# Patient Record
Sex: MALE | Race: WHITE | Employment: FULL TIME | URBAN - METROPOLITAN AREA
[De-identification: names, ages, dates, MRNs, and addresses within clinical notes are randomized per-mention and may not be internally consistent; named-entity substitution may affect disease eponyms.]

---

## 2017-01-18 ENCOUNTER — OFFICE VISIT (OUTPATIENT)
Dept: SURGERY | Age: 52
End: 2017-01-18

## 2017-01-18 VITALS
HEIGHT: 70 IN | DIASTOLIC BLOOD PRESSURE: 65 MMHG | SYSTOLIC BLOOD PRESSURE: 118 MMHG | OXYGEN SATURATION: 100 % | WEIGHT: 212 LBS | BODY MASS INDEX: 30.35 KG/M2 | HEART RATE: 54 BPM

## 2017-01-18 DIAGNOSIS — K90.9 INTESTINAL MALABSORPTION, UNSPECIFIED TYPE: Primary | ICD-10-CM

## 2017-01-18 DIAGNOSIS — Z98.84 STATUS POST GASTRIC BYPASS FOR OBESITY: ICD-10-CM

## 2017-01-18 RX ORDER — BISMUTH SUBSALICYLATE 262 MG
1 TABLET,CHEWABLE ORAL DAILY
COMMUNITY

## 2017-01-18 NOTE — MR AVS SNAPSHOT
Visit Information Date & Time Provider Department Dept. Phone Encounter #  
 1/18/2017 11:30 AM Anita Walker [facility] [phone] Your Appointments 7/19/2017 11:30 AM  
Office Visit with MD Emily Walker [facility]) Appt Note: f/u  
 [address] Jose Daniel [address] [place] [address] [address]  
  
   
 [address] [place] [address] Upcoming Health Maintenance Date Due DTaP/Tdap/Td series (1 - Tdap) 3/3/1988 INFLUENZA AGE 9 TO ADULT 8/1/2016 Allergies as of 1/18/2017  Review Complete On: 1/18/2017 By: Cinthia Ferrara No Known Allergies Current Immunizations  Never Reviewed No immunizations on file. Not reviewed this visit You Were Diagnosed With   
  
 Codes Comments Intestinal malabsorption, unspecified type    -  Primary ICD-10-CM: K90.9 ICD-9-CM: 579.9 Status post gastric bypass for obesity     ICD-10-CM: Z98.84 ICD-9-CM: V45.86 Vitals BP Pulse Height(growth percentile) Weight(growth percentile) SpO2 BMI  
 118/65 (BP 1 Location: Left arm, BP Patient Position: Sitting) (!) 54 5' 10\" (1.778 m) 212 lb (96.2 kg) 100% 30.42 kg/m2 Smoking Status Never Smoker BMI and BSA Data Body Mass Index Body Surface Area  
 30.42 kg/m 2 2.18 m 2 Preferred Pharmacy Pharmacy Name Phone [id number] [address] [place] [address] [address] [place] [address], [address] [place] [address] [phone] Your Updated Medication List  
  
   
This list is accurate as of: 1/18/17 12:05 PM.  Always use your most recent med list.  
  
  
  
  
 multivitamin tablet Commonly known as:  ONE A DAY Take 1 Tab by mouth daily. multivitamin with iron chewable tablet Commonly known as:  Jeanne Caroli Take 1 Tab by mouth daily. Indications: bariatric advantage PROBIOTIC PO Take  by mouth. ursodiol 500 mg tablet Commonly known as:  SERGIO FORTE Take 1 Tab by mouth daily. Take with food. Indications: CHOLELITHIASIS PREVENTION  
  
 VITAMIN B-12 1,000 mcg sublingual tablet Generic drug:  cyanocobalamin Take 1,000 mcg by mouth daily. Patient Instructions Body Mass Index: Care Instructions Your Care Instructions Body mass index (BMI) can help you see if your weight is raising your risk for health problems. It uses a formula to compare how much you weigh with how tall you are. A BMI between 18.5 and 24.9 is considered healthy. A BMI between 25 and 29.9 is considered overweight. A BMI of 30 or higher is considered obese. If your BMI is in the normal range, it means that you have a lower risk for weight-related health problems. If your BMI is in the overweight or obese range, you may be at increased risk for weight-related health problems, such as high blood pressure, heart disease, stroke, arthritis or joint pain, and diabetes. BMI is just one measure of your risk for weight-related health problems. You may be at higher risk for health problems if you are not active, you eat an unhealthy diet, or you drink too much alcohol or use tobacco products. Follow-up care is a key part of your treatment and safety. Be sure to make and go to all appointments, and call your doctor if you are having problems. It's also a good idea to know your test results and keep a list of the medicines you take. How can you care for yourself at home? · Practice healthy eating habits. This includes eating plenty of fruits, vegetables, whole grains, lean protein, and low-fat dairy. · Get at least 30 minutes of exercise 5 days a week or more. Brisk walking is a good choice. You also may want to do other activities, such as running, swimming, cycling, or playing tennis or team sports. · Do not smoke. Smoking can increase your risk for health problems.  If you need help quitting, talk to your doctor about stop-smoking programs and medicines. These can increase your chances of quitting for good. · Limit alcohol to 2 drinks a day for men and 1 drink a day for women. Too much alcohol can cause health problems. If you have a BMI higher than 25 · Your doctor may do other tests to check your risk for weight-related health problems. This may include measuring the distance around your waist. A waist measurement of more than 40 inches in men or 35 inches in women can increase the risk of weight-related health problems. · Talk with your doctor about steps you can take to stay healthy or improve your health. You may need to make lifestyle changes to lose weight and stay healthy, such as changing your diet and getting regular exercise. Where can you learn more? Go to http://dalila-eliel.info/. Enter S176 in the search box to learn more about \"Body Mass Index: Care Instructions. \" Current as of: February 16, 2016 Content Version: 11.1 © 0864-3763 [employer]. Care instructions adapted under license by [employer] (which disclaims liability or warranty for this information). If you have questions about a medical condition or this instruction, always ask your healthcare professional. [address] any warranty or liability for your use of this information. Introducing [facility]! Dae Doe introduces [employer] patient portal. Now you can access parts of your medical record, email your doctor's office, and request medication refills online. 1. In your internet browser, go to [email or web address]. [email or web address] 2. Click on the First Time User? Click Here link in the Sign In box. You will see the New Member Sign Up page. 3. Enter your [employer] Access Code exactly as it appears below. You will not need to use this code after youve completed the sign-up process.  If you do not sign up before the expiration date, you must request a new code. · kingsky Access Code: [email or web address] Expires: 2/16/2017 12:20 PM 
 
4. Enter the last four digits of your Social Security Number (xxxx) and Date of Birth (mm/dd/yyyy) as indicated and click Submit. You will be taken to the next sign-up page. 5. Create a kingsky ID. This will be your kingsky login ID and cannot be changed, so think of one that is secure and easy to remember. 6. Create a kingsky password. You can change your password at any time. 7. Enter your Password Reset Question and Answer. This can be used at a later time if you forget your password. 8. Enter your e-mail address. You will receive e-mail notification when new information is available in [address] 9. Click Sign Up. You can now view and download portions of your medical record. 10. Click the Download Summary menu link to download a portable copy of your medical information. If you have questions, please visit the Frequently Asked Questions section of the kingsky website. Remember, kingsky is NOT to be used for urgent needs. For medical emergencies, dial 911. Now available from your iPhone and Android! Please provide this summary of care documentation to your next provider. Your primary care clinician is listed as Mohamud Hunter. If you have any questions after today's visit, please call [phone].

## 2017-01-18 NOTE — PROGRESS NOTES
6.5 months follow-up    Subjective:     Santos Cardenas  is a 54 y.o. male who presents for follow-up about 6.5 months following laparoscopic gastric bypass surgery. He   has lost a total of 111 pounds since surgery. Body mass index is 30.42 kg/(m^2). . EBWL is (64%). The patient presents today to   assess their progress toward their goal of weight loss and to address any issues that may be present. Today the patient and I have   reviewed their diet and how appropriate their food choices are. The following issues have been identified - none from a surgical   standpoint. He lost 80 lbs prior to surgery making his total weight loss 191 lbs. Pain assessment - 0/10  . Surgery related complication: NA       He reports no real issues and denies vomiting, abdominal pain, diarrhea and difficulty breathing. The patient's exercise level: very active. Changes in his medical history and medications have been reviewed.     Patient Active Problem List   Diagnosis Code    Morbid obesity with BMI of 50.0-59.9, adult ([facility]) E66.01, Z68.43    Hypertension I10    Anxiety F41.9    Snoring R06.83    GERD (gastroesophageal reflux disease) K21.9    Arthritic-like pain M25.50    Morbid obesity with BMI of 45.0-49.9, adult ([facility]) E66.01, Z68.42    S/P bariatric surgery Z98.84    Intestinal malabsorption K90.9    Status post gastric bypass for obesity Z98.84     Past Medical History   Diagnosis Date    Anxiety     Arthritic-like pain     GERD (gastroesophageal reflux disease)      pt states resolved    Hypertension      pt states resolved    Intestinal malabsorption     Morbid obesity with BMI of 50.0-59.9, adult ([facility])     Snoring      no official diagnosis of sleep apnea    Status post gastric bypass for obesity 06/2016     teresa     Past Surgical History   Procedure Laterality Date    Pr lap gastric bypass/candie-en-y  06/2016     teresa     Current Outpatient Prescriptions   Medication Sig Dispense Refill    multivitamin (ONE A DAY) tablet Take 1 Tab by mouth daily.  LACTOBACILLUS ACIDOPHILUS (PROBIOTIC PO) Take  by mouth.  cyanocobalamin (VITAMIN B-12) 1,000 mcg sublingual tablet Take 1,000 mcg by mouth daily.  ursodiol (SERGIO FORTE) 500 mg tablet Take 1 Tab by mouth daily. Take with food. Indications: CHOLELITHIASIS PREVENTION 30 Tab 4    multivitamin with iron (FLINTSTONES) chewable tablet Take 1 Tab by mouth daily. Indications: bariatric advantage           Review of Symptoms:     General - No history or complaints of unexpected fever or chills  Head/Neck - No history or complaints of headache or dizziness  Cardiac - No history or complaints of chest pain, palpitations, or shortness of breath  Pulmonary - No history or complaints of shortness of breath or productive cough  Gastrointestinal - as noted above  Genitourinary - No history or complaints of hematuria/dysuria or renal lithiasis  Musculoskeletal - No history or complaints of joint  muscular weakness  Hematologic - No history of any bleeding episodes  Neurologic - No history or complaints of  migraine headaches or neurologic symptoms    Objective:     Visit Vitals    /65 (BP 1 Location: Left arm, BP Patient Position: Sitting)    Pulse (!) 54    Ht 5' 10\" (1.778 m)    Wt 96.2 kg (212 lb)    SpO2 100%    BMI 30.42 kg/m2        Physical Exam:    General:  alert, cooperative, no distress, appears stated age   Lungs:   clear to auscultation bilaterally   Heart:  Regular rate and rhythm   Abdomen:   abdomen is soft without significant tenderness, masses, organomegaly or guarding;     Incisions: healing well, no significant drainage         Labs:     Recent Results (from the past 2016 hour(s))   VITAMIN B12 & FOLATE    Collection Time: 11/18/16 12:33 PM   Result Value Ref Range    Vitamin B12 >2000 (H) 211 - 911 pg/mL    Folate >20.0 (H) 3.10 - 17.50 ng/mL   CBC WITH AUTOMATED DIFF    Collection Time: 11/18/16 12:33 PM   Result Value Ref Range    WBC 7.4 4.6 - 13.2 K/uL    RBC 5.15 4.70 - 5.50 M/uL    HGB 15.2 13.0 - 16.0 g/dL    HCT 43.9 36.0 - 48.0 %    MCV 85.2 74.0 - 97.0 FL    MCH 29.5 24.0 - 34.0 PG    MCHC 34.6 31.0 - 37.0 g/dL    RDW 14.2 11.6 - 14.5 %    PLATELET 196 622 - 579 K/uL    MPV 11.4 9.2 - 11.8 FL    NEUTROPHILS 66 40 - 73 %    LYMPHOCYTES 27 21 - 52 %    MONOCYTES 6 3 - 10 %    EOSINOPHILS 1 0 - 5 %    BASOPHILS 0 0 - 2 %    ABS. NEUTROPHILS 4.9 1.8 - 8.0 K/UL    ABS. LYMPHOCYTES 2.0 0.9 - 3.6 K/UL    ABS. MONOCYTES 0.4 0.05 - 1.2 K/UL    ABS. EOSINOPHILS 0.1 0.0 - 0.4 K/UL    ABS. BASOPHILS 0.0 0.0 - 0.06 K/UL    DF AUTOMATED     FERRITIN    Collection Time: 11/18/16 12:33 PM   Result Value Ref Range    Ferritin 511 (H) 8 - 388 NG/ML   IRON    Collection Time: 11/18/16 12:33 PM   Result Value Ref Range    Iron 72 50 - 668 ug/dL   METABOLIC PANEL, COMPREHENSIVE    Collection Time: 11/18/16 12:33 PM   Result Value Ref Range    Sodium 145 136 - 145 mmol/L    Potassium 4.4 3.5 - 5.5 mmol/L    Chloride 106 100 - 108 mmol/L    CO2 31 21 - 32 mmol/L    Anion gap 8 3.0 - 18 mmol/L    Glucose 79 74 - 99 mg/dL    BUN 15 7.0 - 18 MG/DL    Creatinine 0.72 0.6 - 1.3 MG/DL    BUN/Creatinine ratio 21 (H) 12 - 20      GFR est AA >60 >60 ml/min/1.73m2    GFR est non-AA >60 >60 ml/min/1.73m2    Calcium 9.5 8.5 - 10.1 MG/DL    Bilirubin, total 0.9 0.2 - 1.0 MG/DL    ALT 16 16 - 61 U/L    AST 11 (L) 15 - 37 U/L    Alk. phosphatase 75 45 - 117 U/L    Protein, total 7.2 6.4 - 8.2 g/dL    Albumin 4.2 3.4 - 5.0 g/dL    Globulin 3.0 2.0 - 4.0 g/dL    A-G Ratio 1.4 0.8 - 1.7     VITAMIN B1, WHOLE BLOOD    Collection Time: 11/18/16 12:33 PM   Result Value Ref Range    Vitamin B1 232.1 (H) 66.5 - 200.0 nmol/L   VITAMIN D, 25 HYDROXY    Collection Time: 11/18/16 12:34 PM   Result Value Ref Range    Vitamin D 25-Hydroxy 37.5 30 - 100 ng/mL       Assessment:     1. History of Morbid obesity, status post  laparoscopic gastric bypass surgery.  Doing well, no concerns. He has stopped his MVI with iron   given his ferritin of 511. He desires another 22 lbs to reach his goal of 190 lbs. He will add wt resistance to his plan as he only does cardio   at this point. A recent PCP visit was unremarkable. Plan:     1. Remember to measure portions, continue low carbohydrate diet  2. Lab reviewed with patient and appropriate changes were made to vitamin regimen. 3. Remember vitamin supplements - The importance of such was discussed regarding the malabsorptive issues that the surgery creates  4. Exercise regimen appears adequate. 5. Attend support group  6. Follow-up in 6 month(s). 7. Total time spent with the patient 30 minutes.   8. The patient understands the plan of action

## 2017-01-18 NOTE — PATIENT INSTRUCTIONS
Body Mass Index: Care Instructions  Your Care Instructions    Body mass index (BMI) can help you see if your weight is raising your risk for health problems. It uses a formula to compare how much you weigh with how tall you are. A BMI between 18.5 and 24.9 is considered healthy. A BMI between 25 and 29.9 is considered overweight. A BMI of 30 or higher is considered obese. If your BMI is in the normal range, it means that you have a lower risk for weight-related health problems. If your BMI is in the overweight or obese range, you may be at increased risk for weight-related health problems, such as high blood pressure, heart disease, stroke, arthritis or joint pain, and diabetes. BMI is just one measure of your risk for weight-related health problems. You may be at higher risk for health problems if you are not active, you eat an unhealthy diet, or you drink too much alcohol or use tobacco products. Follow-up care is a key part of your treatment and safety. Be sure to make and go to all appointments, and call your doctor if you are having problems. It's also a good idea to know your test results and keep a list of the medicines you take. How can you care for yourself at home? · Practice healthy eating habits. This includes eating plenty of fruits, vegetables, whole grains, lean protein, and low-fat dairy. · Get at least 30 minutes of exercise 5 days a week or more. Brisk walking is a good choice. You also may want to do other activities, such as running, swimming, cycling, or playing tennis or team sports. · Do not smoke. Smoking can increase your risk for health problems. If you need help quitting, talk to your doctor about stop-smoking programs and medicines. These can increase your chances of quitting for good. · Limit alcohol to 2 drinks a day for men and 1 drink a day for women. Too much alcohol can cause health problems.   If you have a BMI higher than 25  · Your doctor may do other tests to check your risk for weight-related health problems. This may include measuring the distance around your waist. A waist measurement of more than 40 inches in men or 35 inches in women can increase the risk of weight-related health problems. · Talk with your doctor about steps you can take to stay healthy or improve your health. You may need to make lifestyle changes to lose weight and stay healthy, such as changing your diet and getting regular exercise. Where can you learn more? Go to http://dalila-eliel.info/. Enter S176 in the search box to learn more about \"Body Mass Index: Care Instructions. \"  Current as of: February 16, 2016  Content Version: 11.1  © 2095-7110 [employer]. Care instructions adapted under license by [employer] (which disclaims liability or warranty for this information). If you have questions about a medical condition or this instruction, always ask your healthcare professional. [place] [address] any warranty or liability for your use of this information. Patient Instructions      1. Continue to monitor carbohydrate and protein intake- remember to keep your           total  carbohydrates to 50 grams or less per day for best results. 2. Remember hydration goals - usually 48 to 64 ounces of liquids per day  3. Continue to work towards exercise goals - minimum 3 days per week of 45          minutes to  1 hour at a time. 4. Remember to take vitamins as directed        Supplement Resource Guide    Importance of Protein:   Maintains lean body mass, produces antibodies to fight off infections, heals wounds, minimizes hair loss, helps to give you energy, helps with satiety, and keeping you full between meals.     Importance of Calcium:  Needed for healthy bones and teeth, normal blood clotting, and nervous system functioning, higher risk of osteoporosis and bone disease with non-compliance. Importance of Multivitamins: Many functions. Supply you with extra nutrients that you may be missing from food. May lead to iron deficiency anemia, weakness, fatigue, and many other symptoms with non-compliance. Importance of B Vitamins:  Important for red blood cell formation, metabolism, energy, and helps to maintain a healthy nervous system. Protein Supplement  Find one you like now. Use immediately after surgery. Look for:  35-50g protein each day from your protein supplement once you reach the progression diet. 0-3 g fat per serving  0-3 g sugar per serving    Protein drinks should be split in separate dosages. Recommend: Lifelong  1 year + Calcium Supplement:     Start taking within a month after surgery. Look for: Calcium Citrate Plus D (1500 mg per day)  Recommend: Citracal     .          Avoid chocolate chewable calcium. Can use chewable bariatric or [employer] brand or similar chewable. The body cannot absorb more than 500-600 mg @ a time. Take for Life Multi-vitamin Supplement:      1st Month After Surgery: Any complete chewable, such as: [place]s Complete chewables. Avoid [place] sours or gummies. They lack iron and other important nutrients and also have added sugar. Continue with chewable vitamin or change to adult complete multivitamin one month after surgery. Menstruating women can take a prenatal vitamin. Make sure has at least 18 mg iron and 477-635 mcg folic acid):    Vitamin B12, B Complex Vitamin, and Biotin  Start taking within a month after surgery. Vitamin B12:  1000 mcg of Vitamin B12 three times weekly    Must take sublingually (meaning you take it under your tongue) or in a liquid drop form for easy absorption. B Complex Vitamin: Take a pill or liquid drop form once daily. Biotin: This vitamin can help prevent hair loss.     Recommend 5mg   (5000 mcg) a day  Biotin is Optional

## 2017-07-19 ENCOUNTER — OFFICE VISIT (OUTPATIENT)
Dept: SURGERY | Age: 52
End: 2017-07-19

## 2017-07-19 ENCOUNTER — HOSPITAL ENCOUNTER (OUTPATIENT)
Dept: LAB | Age: 52
Discharge: HOME | End: 2017-07-19
Payer: COMMERCIAL

## 2017-07-19 VITALS
HEIGHT: 70 IN | OXYGEN SATURATION: 100 % | HEART RATE: 50 BPM | SYSTOLIC BLOOD PRESSURE: 106 MMHG | DIASTOLIC BLOOD PRESSURE: 61 MMHG | WEIGHT: 176 LBS | BODY MASS INDEX: 25.2 KG/M2

## 2017-07-19 DIAGNOSIS — Z98.84 S/P BARIATRIC SURGERY: ICD-10-CM

## 2017-07-19 DIAGNOSIS — K90.9 INTESTINAL MALABSORPTION, UNSPECIFIED TYPE: ICD-10-CM

## 2017-07-19 DIAGNOSIS — K90.9 INTESTINAL MALABSORPTION, UNSPECIFIED TYPE: Primary | ICD-10-CM

## 2017-07-19 LAB
25(OH)D3 SERPL-MCNC: 34.7 NG/ML (ref 30–100)
ALBUMIN SERPL BCP-MCNC: 4.4 G/DL (ref 3.4–5)
ALBUMIN/GLOB SERPL: 1.6 {RATIO} (ref 0.8–1.7)
ALP SERPL-CCNC: 58 U/L (ref 45–117)
ALT SERPL-CCNC: 114 U/L (ref 16–61)
ANION GAP BLD CALC-SCNC: 11 MMOL/L (ref 3–18)
AST SERPL W P-5'-P-CCNC: 36 U/L (ref 15–37)
BASOPHILS # BLD AUTO: 0 K/UL (ref 0–0.06)
BASOPHILS # BLD: 1 % (ref 0–2)
BILIRUB SERPL-MCNC: 0.6 MG/DL (ref 0.2–1)
BUN SERPL-MCNC: 26 MG/DL (ref 7–18)
BUN/CREAT SERPL: 39 (ref 12–20)
CALCIUM SERPL-MCNC: 9.2 MG/DL (ref 8.5–10.1)
CHLORIDE SERPL-SCNC: 105 MMOL/L (ref 100–108)
CO2 SERPL-SCNC: 28 MMOL/L (ref 21–32)
CREAT SERPL-MCNC: 0.66 MG/DL (ref 0.6–1.3)
DIFFERENTIAL METHOD BLD: ABNORMAL
EOSINOPHIL # BLD: 0.1 K/UL (ref 0–0.4)
EOSINOPHIL NFR BLD: 1 % (ref 0–5)
ERYTHROCYTE [DISTWIDTH] IN BLOOD BY AUTOMATED COUNT: 13.6 % (ref 11.6–14.5)
FERRITIN SERPL-MCNC: 585 NG/ML (ref 8–388)
FOLATE SERPL-MCNC: >20 NG/ML (ref 3.1–17.5)
GLOBULIN SER CALC-MCNC: 2.8 G/DL (ref 2–4)
GLUCOSE SERPL-MCNC: 85 MG/DL (ref 74–99)
HCT VFR BLD AUTO: 40.4 % (ref 36–48)
HGB BLD-MCNC: 14 G/DL (ref 13–16)
IRON SERPL-MCNC: 95 UG/DL (ref 50–175)
LYMPHOCYTES # BLD AUTO: 39 % (ref 21–52)
LYMPHOCYTES # BLD: 2 K/UL (ref 0.9–3.6)
MCH RBC QN AUTO: 30 PG (ref 24–34)
MCHC RBC AUTO-ENTMCNC: 34.7 G/DL (ref 31–37)
MCV RBC AUTO: 86.7 FL (ref 74–97)
MONOCYTES # BLD: 0.3 K/UL (ref 0.05–1.2)
MONOCYTES NFR BLD AUTO: 6 % (ref 3–10)
NEUTS SEG # BLD: 2.8 K/UL (ref 1.8–8)
NEUTS SEG NFR BLD AUTO: 53 % (ref 40–73)
PLATELET # BLD AUTO: 158 K/UL (ref 135–420)
PMV BLD AUTO: 10.9 FL (ref 9.2–11.8)
POTASSIUM SERPL-SCNC: 4.1 MMOL/L (ref 3.5–5.5)
PROT SERPL-MCNC: 7.2 G/DL (ref 6.4–8.2)
RBC # BLD AUTO: 4.66 M/UL (ref 4.7–5.5)
SODIUM SERPL-SCNC: 144 MMOL/L (ref 136–145)
VIT B12 SERPL-MCNC: 1385 PG/ML (ref 211–911)
WBC # BLD AUTO: 5.2 K/UL (ref 4.6–13.2)

## 2017-07-19 PROCEDURE — 36415 COLL VENOUS BLD VENIPUNCTURE: CPT | Performed by: SPECIALIST

## 2017-07-19 PROCEDURE — 82607 VITAMIN B-12: CPT | Performed by: SPECIALIST

## 2017-07-19 PROCEDURE — 85025 COMPLETE CBC W/AUTO DIFF WBC: CPT | Performed by: SPECIALIST

## 2017-07-19 PROCEDURE — 83540 ASSAY OF IRON: CPT | Performed by: SPECIALIST

## 2017-07-19 PROCEDURE — 82728 ASSAY OF FERRITIN: CPT | Performed by: SPECIALIST

## 2017-07-19 PROCEDURE — 80053 COMPREHEN METABOLIC PANEL: CPT | Performed by: SPECIALIST

## 2017-07-19 PROCEDURE — 84425 ASSAY OF VITAMIN B-1: CPT | Performed by: SPECIALIST

## 2017-07-19 PROCEDURE — 82306 VITAMIN D 25 HYDROXY: CPT | Performed by: SPECIALIST

## 2017-07-19 NOTE — PATIENT INSTRUCTIONS
Body Mass Index: Care Instructions  Your Care Instructions    Body mass index (BMI) can help you see if your weight is raising your risk for health problems. It uses a formula to compare how much you weigh with how tall you are. · A BMI lower than 18.5 is considered underweight. · A BMI between 18.5 and 24.9 is considered healthy. · A BMI between 25 and 29.9 is considered overweight. A BMI of 30 or higher is considered obese. If your BMI is in the normal range, it means that you have a lower risk for weight-related health problems. If your BMI is in the overweight or obese range, you may be at increased risk for weight-related health problems, such as high blood pressure, heart disease, stroke, arthritis or joint pain, and diabetes. If your BMI is in the underweight range, you may be at increased risk for health problems such as fatigue, lower protection (immunity) against illness, muscle loss, bone loss, hair loss, and hormone problems. BMI is just one measure of your risk for weight-related health problems. You may be at higher risk for health problems if you are not active, you eat an unhealthy diet, or you drink too much alcohol or use tobacco products. Follow-up care is a key part of your treatment and safety. Be sure to make and go to all appointments, and call your doctor if you are having problems. It's also a good idea to know your test results and keep a list of the medicines you take. How can you care for yourself at home? · Practice healthy eating habits. This includes eating plenty of fruits, vegetables, whole grains, lean protein, and low-fat dairy. · If your doctor recommends it, get more exercise. Walking is a good choice. Bit by bit, increase the amount you walk every day. Try for at least 30 minutes on most days of the week. · Do not smoke. Smoking can increase your risk for health problems. If you need help quitting, talk to your doctor about stop-smoking programs and medicines. These can increase your chances of quitting for good. · Limit alcohol to 2 drinks a day for men and 1 drink a day for women. Too much alcohol can cause health problems. If you have a BMI higher than 25  · Your doctor may do other tests to check your risk for weight-related health problems. This may include measuring the distance around your waist. A waist measurement of more than 40 inches in men or 35 inches in women can increase the risk of weight-related health problems. · Talk with your doctor about steps you can take to stay healthy or improve your health. You may need to make lifestyle changes to lose weight and stay healthy, such as changing your diet and getting regular exercise. If you have a BMI lower than 18.5  · Your doctor may do other tests to check your risk for health problems. · Talk with your doctor about steps you can take to stay healthy or improve your health. You may need to make lifestyle changes to gain or maintain weight and stay healthy, such as getting more healthy foods in your diet and doing exercises to build muscle. Where can you learn more? Go to http://dalila-eliel.info/. Enter S176 in the search box to learn more about \"Body Mass Index: Care Instructions. \"  Current as of: January 23, 2017  Content Version: 11.3  © 2284-6830 [employer]. Care instructions adapted under license by [employer] (which disclaims liability or warranty for this information). If you have questions about a medical condition or this instruction, always ask your healthcare professional. [place] [address] any warranty or liability for your use of this information. Patient Instructions      1. Continue to monitor carbohydrate and protein intake- remember to keep your           total  carbohydrates to 50 grams or less per day for best results.   2. Remember hydration goals - usually 48 to 64 ounces of liquids per day  3. Continue to work towards exercise goals - minimum 3 days per week of 45          minutes to  1 hour at a time. 4. Remember to take vitamins as directed        Supplement Resource Guide    Importance of Protein:   Maintains lean body mass, produces antibodies to fight off infections, heals wounds, minimizes hair loss, helps to give you energy, helps with satiety, and keeping you full between meals. Importance of Calcium:  Needed for healthy bones and teeth, normal blood clotting, and nervous system functioning, higher risk of osteoporosis and bone disease with non-compliance. Importance of Multivitamins: Many functions. Supply you with extra nutrients that you may be missing from food. May lead to iron deficiency anemia, weakness, fatigue, and many other symptoms with non-compliance. Importance of B Vitamins:  Important for red blood cell formation, metabolism, energy, and helps to maintain a healthy nervous system. Protein Supplement  Find one you like now. Use immediately after surgery. Look for:  35-50g protein each day from your protein supplement once you reach the progression diet. 0-3 g fat per serving  0-3 g sugar per serving    Protein drinks should be split in separate dosages. Recommend: Lifelong  1 year + Calcium Supplement:     Start taking within a month after surgery. Look for: Calcium Citrate Plus D (1500 mg per day)  Recommend: Citracal     .          Avoid chocolate chewable calcium. Can use chewable bariatric or [employer] brand or similar chewable. The body cannot absorb more than 500-600 mg @ a time. Take for Life Multi-vitamin Supplement:      1st Month After Surgery: Any complete chewable, such as: Buffalos Complete chewables. Avoid Buffalo sours or gummies. They lack iron and other important nutrients and also have added sugar.     Continue with chewable vitamin or change to adult complete multivitamin one month after surgery. Menstruating women can take a prenatal vitamin. Make sure has at least 18 mg iron and 205-238 mcg folic acid):    Vitamin B12, B Complex Vitamin, and Biotin  Start taking within a month after surgery. Vitamin B12:  1000 mcg of Vitamin B12 three times weekly    Must take sublingually (meaning you take it under your tongue) or in a liquid drop form for easy absorption. B Complex Vitamin: Take a pill or liquid drop form once daily. Biotin: This vitamin can help prevent hair loss.     Recommend 5mg   (5000 mcg) a day  Biotin is Optional

## 2017-07-19 NOTE — PROGRESS NOTES
Subjective:     Thad Og  is a 50 y.o. male who presents for follow-up about 1 year following laparoscopic gastric bypass surgery. He has lost a total of 147 pounds ( 84 pounds additional lost pre op ) since surgery. Body mass index is 25.25 kg/(m^2). . EBWL is (85%). The patient presents today to assess their progress toward their goal of weight loss and to address any issues that may be present. Today the patient and I have reviewed their diet and how appropriate their food choices are. The following issues have been identified : no new issues. .  Surgery related complication: none       He reports no issues and denies vomiting and abdominal pain. The patients diet choices have been reviewed today and are perfect. Patients pain score:0    The patient's exercise level: very active. Changes in his medical history and medications have been reviewed. Patient Active Problem List   Diagnosis Code    Anxiety F41.9    Arthritic-like pain M25.50    S/P bariatric surgery Z98.84    Intestinal malabsorption K90.9     Past Medical History:   Diagnosis Date    Anxiety     Arthritic-like pain     GERD (gastroesophageal reflux disease)     pt states resolved    Hypertension     pt states resolved    Intestinal malabsorption     Morbid obesity with BMI of 50.0-59.9, adult ([facility] [address]     Snoring     no official diagnosis of sleep apnea    Status post gastric bypass for obesity 06/2016    teresa     Past Surgical History:   Procedure Laterality Date    LAP GASTRIC BYPASS/ARNALDO-EN-Y  06/2016    teresa     Current Outpatient Prescriptions   Medication Sig Dispense Refill    CALCIUM CITRATE PO Take  by mouth.  multivitamin (ONE A DAY) tablet Take 1 Tab by mouth daily.  cyanocobalamin (VITAMIN B-12) 1,000 mcg sublingual tablet Take 1,000 mcg by mouth daily.  LACTOBACILLUS ACIDOPHILUS (PROBIOTIC PO) Take  by mouth.  ursodiol (SERGIO FORTE) 500 mg tablet Take 1 Tab by mouth daily. Take with food. Indications: CHOLELITHIASIS PREVENTION 30 Tab 4    multivitamin with iron (FLINTSTONES) chewable tablet Take 1 Tab by mouth daily. Indications: bariatric advantage          Review of Symptoms:       General - No history or complaints of unexpected fever or chills  Head/Neck - No history or complaints of headache or dizziness  Cardiac - No history or complaints of chest pain, palpitations, or shortness of breath  Pulmonary - No history or complaints of shortness of breath or productive cough  Gastrointestinal - as noted above  Genitourinary - No history or complaints of hematuria/dysuria or renal lithiasis  Musculoskeletal - No history or complaints of joint  muscular weakness  Hematologic - No history of any bleeding episodes  Neurologic - No history or complaints of  migraine headaches or neurologic symptoms                     Objective:     Visit Vitals    /61 (BP 1 Location: Left arm, BP Patient Position: Sitting)    Pulse (!) 50    Ht 5' 10\" (1.778 m)    Wt 79.8 kg (176 lb)    SpO2 100%    BMI 25.25 kg/m2        Physical Exam:    General:  alert, cooperative, no distress, appears stated age   Lungs:   clear to auscultation bilaterally   Heart:  Regular rate and rhythm   Abdomen:   abdomen is soft without significant tenderness, masses, organomegaly or guarding;     Incisions: healing well       Lab Results   Component Value Date/Time    WBC 7.4 11/18/2016 12:33 PM    HGB 15.2 11/18/2016 12:33 PM    HCT 43.9 11/18/2016 12:33 PM    PLATELET 291 13/57/5500 12:33 PM    MCV 85.2 11/18/2016 12:33 PM     Lab Results   Component Value Date/Time    Sodium 145 11/18/2016 12:33 PM    Potassium 4.4 11/18/2016 12:33 PM    Chloride 106 11/18/2016 12:33 PM    CO2 31 11/18/2016 12:33 PM    Anion gap 8 11/18/2016 12:33 PM    Glucose 79 11/18/2016 12:33 PM    BUN 15 11/18/2016 12:33 PM    Creatinine 0.72 11/18/2016 12:33 PM    BUN/Creatinine ratio 21 11/18/2016 12:33 PM    GFR est AA >60 11/18/2016 12:33 PM    GFR est non-AA >60 11/18/2016 12:33 PM    Calcium 9.5 11/18/2016 12:33 PM    Bilirubin, total 0.9 11/18/2016 12:33 PM    AST (SGOT) 11 11/18/2016 12:33 PM    Alk. phosphatase 75 11/18/2016 12:33 PM    Protein, total 7.2 11/18/2016 12:33 PM    Albumin 4.2 11/18/2016 12:33 PM    Globulin 3.0 11/18/2016 12:33 PM    A-G Ratio 1.4 11/18/2016 12:33 PM    ALT (SGPT) 16 11/18/2016 12:33 PM     Lab Results   Component Value Date/Time    Iron 72 11/18/2016 12:33 PM    Ferritin 511 11/18/2016 12:33 PM     Lab Results   Component Value Date/Time    Folate >20.0 11/18/2016 12:33 PM     Lab Results   Component Value Date/Time    Vitamin D 25-Hydroxy 37.5 11/18/2016 12:34 PM           Assessment:     1. History of Morbid obesity, status post  laparoscopic gastric bypass surgery. Doing well; no concerns. .     Plan:     1. Remember to measure portions, continue low carbohydrate diet  2. Continue to concentrate on protein intake meeting daily requirements  3. Remember vitamin supplements. The importance of such was discussed regarding the malabsorptive issues that the surgery creates. 4. Exercise regimen appears to be: very good  5. Try and attend support group if feasible. 6. Follow-up in 1 year(s). 7. Lab ordered today. 8. Total time spent with the patient 30 minutes.

## 2017-07-21 LAB — VIT B1 BLD-SCNC: 190.2 NMOL/L (ref 66.5–200)

## 2017-08-14 DIAGNOSIS — R10.11 RIGHT UPPER QUADRANT ABDOMINAL PAIN: Primary | ICD-10-CM

## 2017-08-23 ENCOUNTER — HOSPITAL ENCOUNTER (OUTPATIENT)
Dept: ULTRASOUND IMAGING | Age: 52
Discharge: HOME | End: 2017-08-23
Attending: SPECIALIST
Payer: COMMERCIAL

## 2017-08-23 DIAGNOSIS — R10.11 RIGHT UPPER QUADRANT ABDOMINAL PAIN: ICD-10-CM

## 2017-08-23 PROCEDURE — 76700 US EXAM ABDOM COMPLETE: CPT

## 2017-09-01 ENCOUNTER — TELEPHONE (OUTPATIENT)
Dept: SURGERY | Age: 52
End: 2017-09-01

## 2018-09-17 ENCOUNTER — DOCUMENTATION ONLY (OUTPATIENT)
Dept: SURGERY | Age: 53
End: 2018-09-17

## 2018-09-17 NOTE — LETTER
[employer] [place] [facility] [facility] 
 
 
Dear Patient, Your health is our main concern. It is important for your health to have follow-up lab work and to see you surgeon at 2 months, 4 months, 6 months, 9 months and annually after your weight loss surgery. Additionally, the Department of Bariatric Surgery at our hospital is a member of the [employer] [address] Surgical Quality Improvement Program ([facility] NSQIP). As a participant in this program, we gather information on the outcomes of our patients after surgery. Please call the office for a follow up appointment at [phone]. If you have moved out of the area or have changed surgeons please call us and let us know the name of your doctor. Your health and feedback are important to us. We greatly appreciate your response. Thank you, [facility]

## 2018-09-17 NOTE — PROGRESS NOTES
Per [facility] requirements;  E-mail and letter sent for follow up appointment. 3 [facility] [place]  [address] [place] [address] Surgical Specialists  [facility]      Dear Patient,    Your health is our main concern. It is important for your health to have follow-up lab work and to see you surgeon at 2 months, 4 months, 6 months, 9 months and annually after your weight loss surgery. Additionally, the Department of Bariatric Surgery at our hospital is a member of the [employer] [address] Surgical Quality Improvement Program ([facility] NSQIP). As a participant in this program, we gather information on the outcomes of our patients after surgery. Please call the office for a follow up appointment at [phone]. If you have moved out of the area or have changed surgeons please call us and let us know the name of your doctor. Your health and feedback are important to us. We greatly appreciate your response.        Thank you,  3 [facility]

## 2019-05-13 ENCOUNTER — DOCUMENTATION ONLY (OUTPATIENT)
Dept: SURGERY | Age: 54
End: 2019-05-13

## 2019-05-13 NOTE — LETTER
[facility] [place] [facility] 
 
 
Dear Patient, Your health is our main concern. It is important for your health to have follow-up lab work and to see you surgeon at 2 months, 4 months, 6 months, 9 months and annually after your weight loss surgery. Additionally, the Department of Bariatric Surgery at our hospital is a member of the [employer] [address] Surgical Quality Improvement Program ([facility] NSQIP). As a participant in this program, we gather information on the outcomes of our patients after surgery. Please call the office for a follow up appointment at [phone]. If you have moved out of the area or have changed surgeons please call us and let us know the name of your doctor. Your health and feedback are important to us. We greatly appreciate your response. Thank you, [facility] [place] Neil die[place]

## 2019-05-13 NOTE — PROGRESS NOTES
Inpatient Rehabilitation Functional Measures Assessment    Functional Measures  LYNETTE Eating:  LYNETTE Grooming:  LYNETTE Bathing:  LYNETTE Upper Body Dressing:  LYNETTE Lower Body Dressing:  LYNETTE Toileting:    LYNETTE Bladder Management  Level of Assistance:  Frequency/Number of Accidents this Shift:    LYNETTE Bowel Management  Level of Assistance:  Frequency/Number of Accidents this Shift:    LYNETTE Bed/Chair/Wheelchair Transfer:  Bed/chair/wheelchair Transfer Score = 5.  Patient is supervision/set-up for transferring to and from the  bed/chair/wheelchair, requiring: Stand by assistance. Patient requires the  following assistive device(s): Sliding board.  LYNETTE Toilet Transfer:  Activity was not observed.  LYNETTE Tub/Shower Transfer:  Activity was not observed.    Previously Documented Mode of Locomotion at Discharge: Wheelchair  LYNETTE Expected Mode of Locomotion at Discharge: No change to the current  documented expected, most frequently used mode of locomotion at discharge  LYNETTE Walk/Wheelchair:  WHEELCHAIR OBSERVATION   Activity was not observed.    WALK OBSERVATION   Activity was not observed.  LYNETTE Stairs:  Activity was not observed.    LYNETTE Comprehension:  LYNETTE Expression:  LYNETTE Social Interaction:  LYNETTE Problem Solving:  LYNETTE Memory:    Therapy Mode Minutes  Occupational Therapy:  Physical Therapy: Individual: 90 minutes.  Speech Language Pathology:    Discharge Functional Goals:    Signed by: Magda Carney PTA     Per [facility] requirements;  E-mail and letter sent for follow up appointment. [facility] [place]  [facility]  [facility]      Dear Patient,    Your health is our main concern. It is important for your health to have follow-up lab work and to see you surgeon at 2 months, 4 months, 6 months, 9 months and annually after your weight loss surgery. Additionally, the Department of Bariatric Surgery at our hospital is a member of the [employer] [address] Surgical Quality Improvement Program ([facility] NSQIP). As a participant in this program, we gather information on the outcomes of our patients after surgery. Please call the office for a follow up appointment at [phone]. If you have moved out of the area or have changed surgeons please call us and let us know the name of your doctor. Your health and feedback are important to us. We greatly appreciate your response.        Thank you,  [facility] [facility]

## 2020-01-10 ENCOUNTER — IMPORTED ENCOUNTER (OUTPATIENT)
Dept: URBAN - NONMETROPOLITAN AREA CLINIC 1 | Facility: CLINIC | Age: 55
End: 2020-01-10

## 2020-01-10 PROBLEM — H52.4: Noted: 2020-01-10

## 2020-01-10 PROBLEM — H52.223: Noted: 2020-01-10

## 2020-01-10 PROCEDURE — 92015 DETERMINE REFRACTIVE STATE: CPT

## 2020-01-10 PROCEDURE — 92004 COMPRE OPH EXAM NEW PT 1/>: CPT

## 2020-01-10 NOTE — PATIENT DISCUSSION
Mixed Astigmatism OD/Simple Hyperopia OS w/Presbyopia-  discussed findings w/patient-  new spectacle Rx issued-  ok to continue with NVO's at this time -  monitor yearly or prn Fuch's Corneal Dystrophy-  discussed findings w/patient-  1+ very fine guttata noted OU no treatment indicated-  Va is doing well at this time -  monitor yearly or prn; 's Notes: MR 1/10/2020DFE 1/10/2020

## 2022-04-15 ASSESSMENT — TONOMETRY
OD_IOP_MMHG: 19
OS_IOP_MMHG: 19

## 2022-04-15 ASSESSMENT — VISUAL ACUITY
OD_CC: 20/20-1
OS_CC: 20/20-2

## 2023-03-15 ENCOUNTER — HOSPITAL ENCOUNTER (OUTPATIENT)
Facility: HOSPITAL | Age: 58
Discharge: HOME | End: 2023-03-18
Payer: COMMERCIAL

## 2023-03-15 ENCOUNTER — OFFICE VISIT (OUTPATIENT)
Age: 58
End: 2023-03-15
Payer: COMMERCIAL

## 2023-03-15 VITALS
BODY MASS INDEX: 37.24 KG/M2 | TEMPERATURE: 97.9 F | DIASTOLIC BLOOD PRESSURE: 74 MMHG | SYSTOLIC BLOOD PRESSURE: 124 MMHG | HEIGHT: 70 IN | WEIGHT: 260.1 LBS | OXYGEN SATURATION: 100 % | HEART RATE: 66 BPM

## 2023-03-15 DIAGNOSIS — Z98.84 S/P BARIATRIC SURGERY: ICD-10-CM

## 2023-03-15 DIAGNOSIS — K90.9 INTESTINAL MALABSORPTION, UNSPECIFIED TYPE: ICD-10-CM

## 2023-03-15 DIAGNOSIS — U09.9 PERSISTENT FATIGUE AFTER COVID-19: ICD-10-CM

## 2023-03-15 DIAGNOSIS — R53.83 PERSISTENT FATIGUE AFTER COVID-19: ICD-10-CM

## 2023-03-15 DIAGNOSIS — K90.9 INTESTINAL MALABSORPTION, UNSPECIFIED TYPE: Primary | ICD-10-CM

## 2023-03-15 DIAGNOSIS — M25.569 ARTHRALGIA OF KNEE, UNSPECIFIED LATERALITY: ICD-10-CM

## 2023-03-15 DIAGNOSIS — E66.9 OBESITY (BMI 35.0-39.9 WITHOUT COMORBIDITY): ICD-10-CM

## 2023-03-15 LAB
25(OH)D3 SERPL-MCNC: 26.2 NG/ML (ref 30–100)
ALBUMIN SERPL-MCNC: 4.2 G/DL (ref 3.4–5)
ALBUMIN/GLOB SERPL: 1.4 (ref 0.8–1.7)
ALP SERPL-CCNC: 70 U/L (ref 45–117)
ALT SERPL-CCNC: 33 U/L (ref 16–61)
ANION GAP SERPL CALC-SCNC: 6 MMOL/L (ref 3–18)
AST SERPL-CCNC: 21 U/L (ref 10–38)
BASOPHILS # BLD: 0 K/UL (ref 0–0.1)
BASOPHILS NFR BLD: 1 % (ref 0–2)
BILIRUB SERPL-MCNC: 0.6 MG/DL (ref 0.2–1)
BUN SERPL-MCNC: 29 MG/DL (ref 7–18)
BUN/CREAT SERPL: 41 (ref 12–20)
CALCIUM SERPL-MCNC: 9.1 MG/DL (ref 8.5–10.1)
CHLORIDE SERPL-SCNC: 108 MMOL/L (ref 100–111)
CO2 SERPL-SCNC: 27 MMOL/L (ref 21–32)
CREAT SERPL-MCNC: 0.71 MG/DL (ref 0.6–1.3)
DIFFERENTIAL METHOD BLD: NORMAL
EOSINOPHIL # BLD: 0.1 K/UL (ref 0–0.4)
EOSINOPHIL NFR BLD: 1 % (ref 0–5)
ERYTHROCYTE [DISTWIDTH] IN BLOOD BY AUTOMATED COUNT: 13.4 % (ref 11.6–14.5)
FERRITIN SERPL-MCNC: 373 NG/ML (ref 8–388)
FOLATE SERPL-MCNC: >20 NG/ML (ref 3.1–17.5)
GLOBULIN SER CALC-MCNC: 3 G/DL (ref 2–4)
GLUCOSE SERPL-MCNC: 80 MG/DL (ref 74–99)
HCT VFR BLD AUTO: 45.6 % (ref 36–48)
HGB BLD-MCNC: 15.4 G/DL (ref 13–16)
IMM GRANULOCYTES # BLD AUTO: 0 K/UL (ref 0–0.04)
IMM GRANULOCYTES NFR BLD AUTO: 0 % (ref 0–0.5)
IRON SERPL-MCNC: 70 UG/DL (ref 50–175)
LYMPHOCYTES # BLD: 1.6 K/UL (ref 0.9–3.6)
LYMPHOCYTES NFR BLD: 21 % (ref 21–52)
MCH RBC QN AUTO: 29.3 PG (ref 24–34)
MCHC RBC AUTO-ENTMCNC: 33.8 G/DL (ref 31–37)
MCV RBC AUTO: 86.9 FL (ref 78–100)
MONOCYTES # BLD: 0.5 K/UL (ref 0.05–1.2)
MONOCYTES NFR BLD: 7 % (ref 3–10)
NEUTS SEG # BLD: 5.3 K/UL (ref 1.8–8)
NEUTS SEG NFR BLD: 70 % (ref 40–73)
NRBC # BLD: 0 K/UL (ref 0–0.01)
NRBC BLD-RTO: 0 PER 100 WBC
PLATELET # BLD AUTO: 173 K/UL (ref 135–420)
PMV BLD AUTO: 11.1 FL (ref 9.2–11.8)
POTASSIUM SERPL-SCNC: 4 MMOL/L (ref 3.5–5.5)
PROT SERPL-MCNC: 7.2 G/DL (ref 6.4–8.2)
RBC # BLD AUTO: 5.25 M/UL (ref 4.35–5.65)
SODIUM SERPL-SCNC: 141 MMOL/L (ref 136–145)
T4 FREE SERPL-MCNC: 1.2 NG/DL (ref 0.7–1.5)
TSH SERPL DL<=0.05 MIU/L-ACNC: 1.34 UIU/ML (ref 0.36–3.74)
VIT B12 SERPL-MCNC: >2000 PG/ML (ref 211–911)
WBC # BLD AUTO: 7.5 K/UL (ref 4.6–13.2)

## 2023-03-15 PROCEDURE — 99214 OFFICE O/P EST MOD 30 MIN: CPT | Performed by: SPECIALIST

## 2023-03-15 PROCEDURE — 84425 ASSAY OF VITAMIN B-1: CPT

## 2023-03-15 PROCEDURE — 36415 COLL VENOUS BLD VENIPUNCTURE: CPT

## 2023-03-15 PROCEDURE — 82746 ASSAY OF FOLIC ACID SERUM: CPT

## 2023-03-15 PROCEDURE — 80053 COMPREHEN METABOLIC PANEL: CPT

## 2023-03-15 PROCEDURE — 82306 VITAMIN D 25 HYDROXY: CPT

## 2023-03-15 PROCEDURE — 84443 ASSAY THYROID STIM HORMONE: CPT

## 2023-03-15 PROCEDURE — 82728 ASSAY OF FERRITIN: CPT

## 2023-03-15 PROCEDURE — 84402 ASSAY OF FREE TESTOSTERONE: CPT

## 2023-03-15 PROCEDURE — 83540 ASSAY OF IRON: CPT

## 2023-03-15 PROCEDURE — 85025 COMPLETE CBC W/AUTO DIFF WBC: CPT

## 2023-03-15 RX ORDER — ACETAMINOPHEN 325 MG/1
TABLET ORAL EVERY 4 HOURS PRN
COMMUNITY
Start: 2021-12-21

## 2023-03-15 ASSESSMENT — PATIENT HEALTH QUESTIONNAIRE - PHQ9
SUM OF ALL RESPONSES TO PHQ QUESTIONS 1-9: 0
2. FEELING DOWN, DEPRESSED OR HOPELESS: 0
SUM OF ALL RESPONSES TO PHQ QUESTIONS 1-9: 0
SUM OF ALL RESPONSES TO PHQ QUESTIONS 1-9: 0
1. LITTLE INTEREST OR PLEASURE IN DOING THINGS: 0
SUM OF ALL RESPONSES TO PHQ QUESTIONS 1-9: 0
SUM OF ALL RESPONSES TO PHQ9 QUESTIONS 1 & 2: 0

## 2023-03-15 NOTE — PROGRESS NOTES
Julia Lozano  is a 66 y.o. male who presents for follow-up about 7 years following Gastric bypass  Surgery related complication: none. He has lost a total of 63 pounds since surgery. Body mass index is 37.32 kg/m². .      The patient presents today to assess their progress toward their weight loss goal & to address any issues that may be present:   He is tolerating solids without difficulty, reports grazing all day and denies vomiting and abdominal pain. The patients diet choices have been reviewed today and counseling was given. Taking vitamins as recommended. The patient's exercise level: not active. Changes in his medical history and medications have been reviewed. 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        Review of Systems:  General - Denies fatigue, fever, chills  Cardiac - Denies chest pain, palpitations, shortness of breath  Pulmonary - Denies shortness of breath or productive cough  Gastrointestinal - as noted above  Musculoskeletal - Denies joint or muscle weakness, pain, stiffness  Hematologic - Denies abnormal bleeding or bruising  Neurologic - Denies weakness, paralysis, numbness, tingling    Objective:     /74 (Site: Left Upper Arm, Position: Sitting, Cuff Size: Large Adult)   Pulse 66   Temp 97.9 °F (36.6 °C)   Ht 5' 10\" (1.778 m)   Wt 260 lb 1.6 oz (118 kg)   SpO2 100%   BMI 37.32 kg/m²      Physical Exam:      General appearance:  alert, cooperative, no distress, appears stated age   Mental status   alert, oriented to person, place, and time, normal mood, behavior, speech, dress, motor activity, and thought processes   Neck  supple, no significant adenopathy     Lymphatics  no palpable lymphadenopathy, no hepatosplenomegaly   Chest  clear to auscultation, no wheezes, rales or rhonchi, symmetric air entry   Heart  normal rate, regular rhythm, normal S1, S2, no murmurs, rubs, clicks or gallops    Abdomen: soft, nontender, nondistended, no masses or organomegaly   Incision:  Well healed, no hernias      Neurological  alert, oriented, normal speech, no focal findings or movement disorder noted   Musculoskeletal no joint tenderness, deformity or swelling   Extremities peripheral pulses normal, no pedal edema, no clubbing or cyanosis   Skin normal coloration and turgor, no rashes, no suspicious skin lesions noted            Labs:     CMP: No results found for: NA, K, CL, CO2, BUN, CREATININE, GLUCOSE, CALCIUM, PROT, LABALBU, BILITOT, AST, ALT   CBC: No results found for: WBC, RBC, HGB, HCT, MCV, MCH, MCHC, RDW, PLT, MPV   VITAMIN D: No results found for: VITD25  VITAMIN B12 AND FOLATE: No results found for: [id number], FOLATE  IRON: No results found for: IRON  FERRITIN: No results found for: FERRITIN  VITAMIN B1: No results found for: [id number]            Assessment and Plan:   Intestinal malabsorption  continue required Vitamins: B12, B complex, D, iron, calcium, multivitamin  S/p laparoscopic bariatric surgery,laparoscopic gastric bypass surgery , history of morbid obesity  Sleep goal is 7-9 hours each night. Patient education given on the effects of sleep deprivation on weight control. Discussed patients weight loss goals and dietary choices in relation to goals. Reminded to measure portions, continue high protein, low carbohydrate diet. Reminded to eat regularly, to eat slowly & not to drink with meals. Continue cardio exercise and add resistance exercises. 60-90 minutes of aerobic activity 5 days a week and strength training 2 days each week. Encouraged to attend support group   Required fluid intake is >64oz daily of decaffeinated sugar free beverages. Patient to complete labs before next visit. Lab slip given today.   I have discussed this plan with patient and they verbalized understanding    30 minutes spent with patient    Follow up in 6 months or sooner if patient

## 2023-03-15 NOTE — PATIENT INSTRUCTIONS

## 2023-03-20 LAB
TESTOST FREE SERPL-MCNC: 10.5 PG/ML (ref 7.2–24)
VIT B1 BLD-SCNC: 185.2 NMOL/L (ref 66.5–200)

## 2023-05-12 ENCOUNTER — TELEPHONE (OUTPATIENT)
Age: 58
End: 2023-05-12

## 2023-05-12 RX ORDER — ERGOCALCIFEROL 1.25 MG/1
50000 CAPSULE ORAL
Qty: 52 CAPSULE | Refills: 1 | Status: SHIPPED | OUTPATIENT
Start: 2023-05-15

## 2023-05-12 NOTE — TELEPHONE ENCOUNTER
Phone call with pt and advised vitamin d is low and rx of vitamin d [id number] iu 2 x a week. Pt expresses understanding.

## 2025-05-01 ENCOUNTER — CLINICAL DOCUMENTATION (OUTPATIENT)
Facility: HOSPITAL | Age: 60
End: 2025-05-01